# Patient Record
Sex: MALE | Race: WHITE
[De-identification: names, ages, dates, MRNs, and addresses within clinical notes are randomized per-mention and may not be internally consistent; named-entity substitution may affect disease eponyms.]

---

## 2017-06-28 ENCOUNTER — HOSPITAL ENCOUNTER (EMERGENCY)
Dept: HOSPITAL 60 - LB.ED | Age: 57
Discharge: HOME | End: 2017-06-28
Payer: MEDICAID

## 2017-06-28 DIAGNOSIS — H54.7: ICD-10-CM

## 2017-06-28 DIAGNOSIS — R19.7: ICD-10-CM

## 2017-06-28 DIAGNOSIS — A04.8: Primary | ICD-10-CM

## 2017-06-28 NOTE — EDM.PDOC
ED HPI GENERAL MEDICAL PROBLEM





- General


Chief Complaint: Gastrointestinal Problem


Stated Complaint: NAUSEA, VOMITING AND DIARRHEA


Time Seen by Provider: 06/28/17 07:50


Source of Information: Reports: Patient


History Limitations: Reports: No Limitations





- History of Present Illness


INITIAL COMMENTS - FREE TEXT/NARRATIVE: 


Pt is in the emergency room as he has had 6 bouts of watery diarrhea today. He 

claims stools are watery, asso with mild cramping of the abdomen.  He ate some 

potatoes and hamburger last night with manderine oranges.No fever or chills. 

Has not noted any blood or mucus in the stool. Stool is non foul smelling. Does 

feel bloated on and off. Pt claims that he has had diarrhea for more than a 

month now and has lost some weight during the same period. 





He was diagnosed with H-pylori gastritis by  and started on therapy 

with omerpazole, flagyl, carafate and biaxin for 2 wks, he just finished his 

meds yesterday, and it has not got any better as far as his diarrhea is 

concerned. Presently he does not have any more meds or refills and hence he is 

in the emergency room.





Duration: Week(s): (4 weeks or more now)


Improves with: Reports: None


Worsens with: Reports: None





- Related Data


 Allergies











Allergy/AdvReac Type Severity Reaction Status Date / Time


 


No Known Allergies Allergy   Verified 06/14/17 12:35














Past Medical History


HEENT History: Reports: Impaired Vision


Gastrointestinal History: Reports: Chronic Diarrhea, Helicobacter Pylori, Other 

(See Below)


Other Gastrointestinal History: pt being treated for past 2 weeks for H Pylori





- Past Surgical History


GI Surgical History: Reports: None





Social & Family History





- Family History


Family Medical History: Noncontributory





- Tobacco Use


Smoking Status *Q: Never Smoker


Second Hand Smoke Exposure: No





- Caffeine Use


Caffeine Use: Reports: Soda





- Recreational Drug Use


Recreational Drug Use: No





ED ROS GENERAL





- Review of Systems


Review Of Systems: See Below


Constitutional: Denies: Fever, Chills, Weakness


HEENT: Denies: Throat Pain, Throat Swelling, Vision Change


Respiratory: Denies: Cough, Sputum


Cardiovascular: Denies: Chest Pain, Lightheadedness


GI/Abdominal: Reports: Abdominal Pain (epigastric), Diarrhea, Distension, 

Vomiting.  Denies: Bloody Stool, Nausea


: Denies: Flank Pain, Frequency


Musculoskeletal: Denies: Joint Pain, Joint Swelling


Skin: Denies: Pruritis, Rash


Psychiatric: Denies: Agitation, Anxiety





ED EXAM, GENERAL





- Physical Exam


Exam: See Below


Exam Limited By: No Limitations


General Appearance: Alert, WD/WN, No Apparent Distress, Other (hydration is 

appropriate)


Eye Exam: Bilateral Eye: EOMI, PERRL


Ears: Normal External Exam, Normal Canal, Hearing Grossly Normal, Normal TMs


Ear Exam: Bilateral Ear: Auricle Normal, Canal Normal, TM normal


Nose: Normal Inspection, Normal Mucosa, No Blood


Throat/Mouth: Normal Inspection, Normal Lips, Normal Teeth, Normal Gums, Normal 

Oropharynx, Normal Voice, No Airway Compromise


Head: Atraumatic, Normocephalic


Neck: Normal Inspection, Supple, Non-Tender, Full Range of Motion


Respiratory/Chest: No Respiratory Distress, Lungs Clear, Normal Breath Sounds, 

No Accessory Muscle Use, Chest Non-Tender


Cardiovascular: Normal Peripheral Pulses, Regular Rate, Rhythm, No Edema, No 

Gallop, No JVD, No Murmur, No Rub


GI/Abdominal: Normal Bowel Sounds, Soft, No Organomegaly, No Distention, No 

Abnormal Bruit, No Mass, Tender (vague epigastric discomfort).  No: Guarding, 

Rigid, Rebound


Extremities: Normal Inspection, Normal Range of Motion, Non-Tender, Normal 

Capillary Refill, No Pedal Edema





Course





- Vital Signs


Text/Narrative:: 


Pt's CBC, CMP, Amylase and lipase are normal. His Stool C-diff is pending, will 

notify the results.. Apparently patient has been treated for H-pylori infection 

appropriately with 2 wks of antibiotics regime, he needs to be treated for 

underlying gastritis for total for 6 wks, hence I have given him  a script for 

omeprazole 40mg daily. Also I have discussed bland low fat diet during the same 

period. He should get upper GI study as his symptoms of dyspepsia persists, to 

make sure he has not developed peptic ulcer, in which case his PPI treatment 

needs to be for longer.





He did have a stool study for GI infection and O&P 2 wks ago which have been 

negative, so I have not repeated these test today.  He has had his well water 

checked and cleared and it is potable. 





But on further questioning the patient he claims that he has had diarrhea for 

more than a month now and has been loosing weight. Considering his age and his 

 race, he should be getting colonoscopy done.





Pt has been advised to followup with his primary care provider for further 

workup.





Last Recorded V/S: 


 Last Vital Signs











Temp  97.8 F   06/28/17 07:43


 


Pulse  73   06/28/17 07:43


 


Resp  20   06/28/17 07:43


 


BP  117/87   06/28/17 07:43


 


Pulse Ox  100   06/28/17 07:43














- Orders/Labs/Meds


Orders: 


 Active Orders 24 hr











 Category Date Time Status


 


 CLOSTRIDIUM DIFFICILE BY PCR [] Stat Lab  06/28/17 08:09 Uncollected











Labs: 


 Laboratory Tests











  06/28/17 06/28/17 06/28/17 Range/Units





  08:20 08:20 08:20 


 


WBC  4.9  D    (4.0-11.0)  K/uL


 


RBC  4.20 L    (4.50-6.50)  M/uL


 


Hgb  13.3    (13.0-18.0)  g/dL


 


Hct  38.0 L    (40.0-54.0)  %


 


MCV  91    (76-96)  fL


 


MCH  31.7    (27.0-32.0)  pg


 


MCHC  35.0    (31.0-35.0)  g/dL


 


RDW  13.2    (11.0-16.0)  %


 


Plt Count  174    (150-400)  K/uL


 


MPV  9.7    (6.0-10.0)  fL


 


Neut % (Auto)  58.7    (45.0-70.0)  %


 


Lymph % (Auto)  28.6    (20.0-40.0)  %


 


Mono % (Auto)  11.3 H    (3.0-10.0)  %


 


Eos % (Auto)  1.2    (1.0-5.0)  %


 


Baso % (Auto)  0.2    (0.0-0.5)  %


 


Neut # (Auto)  2.85    (2.00-7.50)  K/uL


 


Lymph # (Auto)  1.39 L    (1.50-4.00)  K/uL


 


Mono # (Auto)  0.55    (0.20-0.80)  K/uL


 


Eos # (Auto)  0.06    (0.04-0.40)  K/uL


 


Baso # (Auto)  0.01 L    (0.02-0.10)  K/uL


 


Sodium   142   (136-145)  mmol/L


 


Potassium   4.1   (3.5-5.1)  mmol/L


 


Chloride   105   ()  mmol/L


 


Carbon Dioxide   28.9   (21.0-32.0)  mmol/L


 


Anion Gap   12.2   (5.0-15.0)  mmol/L


 


BUN   17  D   (8-26)  mg/dL


 


Creatinine   0.94   (0.70-1.30)  mg/dL


 


Est Cr Clr Drug Dosing   TNP   


 


Estimated GFR (MDRD)   > 60   (>60)  MLS/MIN


 


BUN/Creatinine Ratio   18.1   (6-25)  


 


Glucose   105 H   ()  mg/dL


 


Calcium   9.0   (8.5-10.1)  mg/dL


 


Total Bilirubin   0.5  D   (0.0-1.0)  mg/dL


 


AST   47 H   (15-37)  U/L


 


ALT   51   (12-78)  U/L


 


Alkaline Phosphatase   53   ()  U/L


 


Total Protein   6.9   (6.4-8.2)  g/dL


 


Albumin   3.7   (3.4-5.0)  g/dL


 


Globulin   3.2   (2.2-4.2)  g/dL


 


Albumin/Globulin Ratio   1.2   (0.8-2.0)  


 


Amylase    87  D  ()  U/L


 


Lipase    251  D  ()  U/L














Departure





- Departure


Time of Disposition: 09:00


Disposition: Home, Self-Care 01


Condition: Fair


Clinical Impression: 


 Helicobacter pylori (H. pylori) infection, Diarrhea








- Discharge Information


Forms:  ED Department Discharge


Additional Instructions: 


Pt's CBC, CMP, Amylase and lipase are normal. His Stool C-diff is pending, will 

notify patient. Apparently patient has been treated for H-pylori infection 

appropriately with 2 wks of antibiotics regime, he needs to be treated for 

underlying gastritis for total for 6 wks, hence I have given him  a script for 

omeprazole 40mg daily. Also I have discussed bland low fat diet during the same 

period. He should get upper GI study as his symptoms of dyspepsia persists, to 

make sure he has not developed peptic ulcer, in which case his PPI treatment 

needs to be for longer.





He did have a stool study for GI infection and O&P 2 wks ago which have been 

negative, so I have not repeated these test today. He has had his well water 

checked and cleared and it is potable. 





But on further questioning the patient he claims that he has had diarrhea for 

more than a month now and has been loosing weight. Considering his age and his 

 race, he should be getting colonoscopy done.





Pt has been advised to followup with his primary care provider for further 

workup.








- Problem List & Annotations


(1) Diarrhea


SNOMED Code(s): 25209582


   Code(s): R19.7 - DIARRHEA, UNSPECIFIED   Status: Acute   Current Visit: Yes 

  





(2) Helicobacter pylori (H. pylori) infection


SNOMED Code(s): 286339652


   Code(s): A04.8 - OTHER SPECIFIED BACTERIAL INTESTINAL INFECTIONS   Status: 

Acute   Current Visit: Yes   





- Problem List Review


Problem List Initiated/Reviewed/Updated: Yes





- My Orders


Last 24 Hours: 


My Active Orders





06/28/17 08:09


CLOSTRIDIUM DIFFICILE BY PCR [RM] Stat 














- Assessment/Plan


Last 24 Hours: 


My Active Orders





06/28/17 08:09


CLOSTRIDIUM DIFFICILE BY PCR [RM] Stat 











Assessment:: 


 H-pylori gastritis with diarrhea





Plan: 


Pt's CBC, CMP, Amylase and lipase are normal. His Stool C-diff is negative. 

Apparently patient has been treated for H-pylori infection appropriately with 2 

wks of antibiotics regime, he needs to be treated for underlying gastritis for 

total for 6 wks, hence I have given him  a script for omeprazole 40mg daily. 

Also I have discussed bland low fat diet during the same period. He should get 

upper GI study as his symptoms of dyspepsia persists, to make sure he has not 

developed peptic ulcer, in which case his PPI treatment needs to be for longer.





He did have a stool study for GI infection and O&P 2 wks ago which have been 

negative, so I have not repeated these test today. He has had his well water 

checked and cleared and it is potable. 





But on further questioning the patient he claims that he has had diarrhea for 

more than a month now and has been loosing weight. Considering his age and his 

 race, he should be getting colonoscopy done.





Pt has been advised to followup with his primary care provider for further 

workup.

## 2017-12-08 ENCOUNTER — HOSPITAL ENCOUNTER (OUTPATIENT)
Dept: HOSPITAL 60 - LB.SDS | Age: 57
Discharge: HOME | End: 2017-12-08
Attending: SURGERY
Payer: MEDICAID

## 2017-12-08 DIAGNOSIS — K29.50: ICD-10-CM

## 2017-12-08 DIAGNOSIS — K29.80: ICD-10-CM

## 2017-12-08 DIAGNOSIS — K57.30: ICD-10-CM

## 2017-12-08 DIAGNOSIS — K44.9: ICD-10-CM

## 2017-12-08 DIAGNOSIS — Z79.899: ICD-10-CM

## 2017-12-08 DIAGNOSIS — Z12.11: Primary | ICD-10-CM

## 2017-12-08 PROCEDURE — 45378 DIAGNOSTIC COLONOSCOPY: CPT

## 2017-12-08 PROCEDURE — 43239 EGD BIOPSY SINGLE/MULTIPLE: CPT

## 2017-12-08 PROCEDURE — 88305 TISSUE EXAM BY PATHOLOGIST: CPT

## 2017-12-08 NOTE — OR
DATE OF OPERATION:  12/08/2017

 

PREOPERATIVE DIAGNOSES:

1. History of H. pylori.

2. Primary screening colonoscopy, average risk.

 

POSTOPERATIVE DIAGNOSES:

1. Moderate hiatal hernia, approximately 3 cm.

2. Chronic granular gastritis duodenitis.

3. Normal colonoscopy.

 

OPERATION:

1. Esophagogastroduodenoscopy plus biopsy.

2. Screening colonoscopy, average risk.

 

COMPLICATIONS:  None.

 

DRAINS:  None.

 

SPECIMENS:  Gastric antrum for H. pylori and pathology.

 

ESTIMATED BLOOD LOSS:  Minimal.

 

ANESTHESIA:  General propofol anesthesia.

 

INDICATION:  Mr. Leary is a 57-year-old gentleman here for his primary screening colonoscopy.

He is asymptomatic and of average risk.  He does have a remote history of H. pylori

infection and hence was referred for repeat upper endoscopy for evaluation.  The above-

mentioned procedure was explained.  The risks, benefits, and complications were explained.

The patient understood and agreed, and he was brought to the operating room.

 

DESCRIPTION OF PROCEDURE:  The patient was brought to the operating room, placed in the left

lateral decubitus position on the operating room table.  The table was then placed at a 45

degrees angle.  A mouth guard was placed, and the patient was given satisfactory general

propofol anesthesia.  We began by performing an upper endoscopy in which the endoscope was

inserted into the oral cavity and subsequently advanced under direct vision down to the

level of the 2nd portion of the duodenum.  Evaluation was carried out on withdrawal.  The

duodenum was normal except for some granularity in the ampulla and first portion suggestive

of duodenitis.  The stomach also appeared normal with normal rugal folds.  There was some

granularity in the antrum and body.  A biopsy was taken from the antrum for H. pylori

testing and pathology.  Retroflexion was then performed in the stomach which revealed a

moderate-sized hiatal hernia.  No other abnormalities found in the stomach, and stomach was

decompressed.  I withdrew the endoscope to the level of the GE junction and then measured

out the hiatal hernia which was approximately 3 cm in size.  This appeared as a sliding-

type.  The Z-line was regular and normal.  The remainder of the esophagus was normal.  The

endoscope was withdrawn.  The patient tolerated the procedure well.  Next, the table was

turned, and the patient was made flat, but left in the left lateral decubitus position, and

we performed a colonoscopy.  Prior to the colonoscopy, I performed a rectal examination

which was within normal limits.  I then placed the endoscope by finger introduction into the

rectum and subsequently advanced it to the level of the cecum.  Cecum was identified by the

appendiceal orifice, the ileocecal valve, and the cecal strap.  Next, careful evaluation of

the mucosa was performed on withdrawal.  There were no telangiectasias.  No polyps.  No

neoplastic growth; however, there were scattered diverticula throughout the descending and

distal transverse colon.  These were minimal in number.  The sigmoid was normal.  I then

performed a retroflexion maneuver in the rectum, and this was within normal limits as well.

The colon was then decompressed, and the endoscope was withdrawn.  The patient tolerated the

procedure well.  There were no complications.  Instrument count was correct.  The patient

was awoken in the OR and taken to the PACU for recovery.  Recommend followup of biopsy

results and follow up colonoscopy in 10 years.

 

 

DEBI

DD:  12/08/2017 11:01:20

DT:  12/08/2017 15:12:59

Job #:  [id number]

## 2018-05-26 ENCOUNTER — HOSPITAL ENCOUNTER (OUTPATIENT)
Dept: HOSPITAL 60 - LB.ED | Age: 58
Setting detail: OBSERVATION
LOS: 1 days | Discharge: NURSING, REHAB OR LONG-TERM CARE | End: 2018-05-27
Attending: FAMILY MEDICINE | Admitting: FAMILY MEDICINE
Payer: MEDICAID

## 2018-05-26 DIAGNOSIS — Z90.89: ICD-10-CM

## 2018-05-26 DIAGNOSIS — R07.2: Primary | ICD-10-CM

## 2018-05-26 DIAGNOSIS — Z82.49: ICD-10-CM

## 2018-05-26 DIAGNOSIS — R06.02: ICD-10-CM

## 2018-05-26 DIAGNOSIS — Z79.899: ICD-10-CM

## 2018-05-26 PROCEDURE — G0378 HOSPITAL OBSERVATION PER HR: HCPCS

## 2018-05-26 RX ADMIN — PROMETHAZINE HYDROCHLORIDE ONE ML: 6.25 SYRUP ORAL at 17:20

## 2018-05-26 RX ADMIN — Medication PRN ML: at 17:16

## 2018-05-26 RX ADMIN — NITROGLYCERIN ONE MG: 0.4 TABLET SUBLINGUAL at 17:14

## 2018-05-26 NOTE — ER
ER AND ADMISSION NOTE

 

HISTORY OF PRESENT ILLNESS:  The patient is a 57-year-old male who presented to the

emergency room with a chief complaint of chest pain.  The patient had been having chest pain

for about 2 hours, and it started at work in the kitchen.  He is not sure if he was

diaphoretic as it is hot in the kitchen.  He described it as substernal.  The pain does not

radiate to the neck or the arm.  He did have some shortness of breath.  Initially on

arrival, his pain was a 5 to 7.  The patient had a normal 12-lead EKG, was given

nitroglycerin sublingual and his pain improved.  He was also given a GI cocktail as he has a

previous history of H. pylori.  His pain was down to just minimal and he noted it only when

he took a deep breath.  Again, the patient's shortness of breath had resolved.  He was not

diaphoretic on arrival.  EKG was normal.  Initial set of troponins were normal.  It was felt

best to go ahead and admit the patient for observation and repeat later set of troponins.

 

ALLERGIES:  NKDA.

 

 

CURRENT MEDICATIONS:  Advil which he takes 800 mg once to twice daily, he also takes some

Tylenol 325 p.r.n. and omeprazole 20 mg daily.

 

PAST MEDICAL HISTORY:  Significant for H pylori.

 

PAST SURGICAL HISTORY:  Significant for tonsillectomy and adenoidectomy.

 

FAMILY HISTORY:  The patient has a family history of coronary artery disease.  His father is

age 74 and has had several events.  His grandfather's brother  in his 30s.

 

SOCIAL HISTORY:  The patient does not smoke.  He has never had a cholesterol checked.  He

works as a cook.

 

PHYSICAL EXAMINATION:

GENERAL:  He is alert, oriented, no apparent distress.  VITAL SIGNS: Were stable.  HEENT:

Unremarkable.  NECK:  Supple.  No nodes.  No bruits.  HEART:  Regular sinus rhythm without

murmur.  LUNGS:  Clear.  ABDOMEN:  Soft, nontender.  Positive bowel sounds.  No

hepatosplenomegaly.  :  Was not examined.  EXTREMITIES:  No clubbing, cyanosis, or edema.

NEUROLOGIC:  Exam is nonfocal.

 

LABORATORY DATA:  White count is normal at 4.7, hemoglobin and hematocrit is minimally

decreased at 12.5 and 36.4.  Comprehensive metabolic panel is basically within normal

limits.  BUN is 25, creatinine is 1.0.  Initial troponin is less than 0.017.

 

12-lead EKG shows sinus bradycardia, the patient's chest pain had basically resolved.

 

Chest x-ray shows a possible granuloma in the right upper lobe, but otherwise appears

normal.

 

ASSESSMENT:  Chest pain.

 

PLAN:  We will go ahead and refer the patient to 23-hour observation and obtain a repeat

troponin at about 10 o'clock tonight.  If the patient's troponin is negative, we will

discharge him in the morning and set him up for stress test as an outpatient.

 

 

ROHIT

DD:  2018 18:27:22

DT:  2018 20:32:08

Job #:  [id number]

## 2018-05-28 NOTE — CR
PA AND LATERAL CHEST, 05/26/18



No priors.



The heart size is normal.  



There is an 8 mm rounded calcific density overlying the right apex.  It also 
overlies the sternoclavicular junction on the right.  This could be a calcified 
granuloma within the right lung apex consistent with prior granulomatous 
disease.  It could be a sclerotic lesion within the anterior first rib.  
Additional views may be helpful.  



The lungs are otherwise clear.  No pneumothorax.  No pleural effusions.  No 
areas of consolidation.  



[id number]
MTDD

## 2018-05-29 NOTE — DISCH
DISCHARGE/TRANSFER NOTE

 

HISTORY OF PRESENT ILLNESS:  The patient is a 57-year-old male who presented to the

emergency room with chest pain earlier this evening.  At the time, the patient's 12-lead EKG

just showed sinus rhythm with a rate in the 50s.  The patient's blood pressure was good.  He

received 1 sublingual nitroglycerin.  He had taken aspirin prior to arriving in the

emergency room.  He got a GI cocktail as he does have a history of epigastric pain and H

pylori.  His pain resolved pretty much.  The patient was admitted to telemetry.  CBC,

comprehensive metabolic panel, and initial troponin were normal.  We repeated his troponin

about 5 hours after the first one.  His second troponin was also normal.  However, the

patient was on a telemetry bed and his heart rate has gradually decreased.  He is running

quite frequently in the 40s.  His blood pressure drops down to 106/69.  The patient notes

that normally his blood pressure at home runs in the 120s and his heart rate is in the 60s

because he checks it with his girlfriend's blood pressure cuff.  He is minimally symptomatic

with the bradycardia, but he has dropped down to 37 and 38 for brief runs and he is quite

frequently in the low 40s.  His blood pressure is a little lower than normal.  He still has

some very minimal chest pain and it is largely with inspiration.  I did call down and

discuss the patient with Dr. Epstein in Cardiology in [place] who recommended transferring the

patient down.  Our ambulance is currently out and unavailable.  We are only a BLS ambulance.

Since the patient could potentially need external pacing, we opted to fly him down to

[place].  The patient has actually been quite stable with all this, but nonetheless that is

a definitely abnormal heart rate.  EKG really does not show anything other than sinus

bradycardia and the patient's labs have been normal also, and he has been minimally

symptomatic, but I do not really want him to deteriorate and have to send him with an

external pacemaker.

 

 

IRA/TANNA

DD:  05/27/2018 00:53:58

DT:  05/27/2018 01:24:39

Job #:  [id number]

## 2019-01-21 ENCOUNTER — HOSPITAL ENCOUNTER (EMERGENCY)
Dept: HOSPITAL 60 - LB.ED | Age: 59
Discharge: HOME | End: 2019-01-21
Payer: MEDICAID

## 2019-01-21 DIAGNOSIS — G44.209: Primary | ICD-10-CM

## 2019-01-21 DIAGNOSIS — Z79.899: ICD-10-CM

## 2019-01-21 DIAGNOSIS — Z79.82: ICD-10-CM

## 2019-01-21 RX ADMIN — KETOROLAC TROMETHAMINE ONE: 30 INJECTION, SOLUTION INTRAMUSCULAR at 07:30

## 2019-01-21 RX ADMIN — KETOROLAC TROMETHAMINE ONE MG: 30 INJECTION, SOLUTION INTRAMUSCULAR at 07:20

## 2019-01-21 NOTE — EDM.PDOC
ED HPI GENERAL MEDICAL PROBLEM





- General


Chief Complaint: Headache


Stated Complaint: HEADACHE


Time Seen by Provider: 01/21/19 07:15


Source of Information: Reports: Patient


History Limitations: Reports: No Limitations





- History of Present Illness


INITIAL COMMENTS - FREE TEXT/NARRATIVE: 





This patient presents to the ED for evaluation of a headache. He states he woke 

to his alarm at 0520 and felt the headache in the back of his head and both 

sides. He states the headache did not wake him. He felt weak and dizzy and had 

vomiting after drinking gatorade. He did drive himself to the ED despite the 

symptoms described. He rates he pain at 7/10 and continues to complain of 

nausea. 





He denies recent illness but states he "has been hacking all winter, like 

everyone else" but has not had a fever or sought any healthcare for illness. He 

states he has "a bad heart" and was flown to [place] for chest pain this past 

summer but "they didn't find anything" and he is not on any medications.





- Related Data


 Allergies











Allergy/AdvReac Type Severity Reaction Status Date / Time


 


No Known Allergies Allergy   Verified 12/07/17 13:42











Home Meds: 


 Home Meds





Omeprazole 20 mg PO QPM 12/07/17 [History]


Aspirin 81 mg PO DAILY 11/04/18 [History]











Past Medical History


HEENT History: Reports: Impaired Vision


Gastrointestinal History: Reports: Chronic Diarrhea, Helicobacter Pylori


Other Gastrointestinal History: pt being treated for past 2 weeks for H Pylori


Musculoskeletal History: Reports: Arthritis, Fracture





- Past Surgical History


HEENT Surgical History: Reports: Adenoidectomy, Tonsillectomy


GI Surgical History: Reports: Colostomy





Social & Family History





- Family History


Family Medical History: Noncontributory


Cardiac: Reports: CAD, Stent


Other Cardiac Family History: great uncle with MI at 35 years of age


Oncologic: Reports: Breast, Lung





- Caffeine Use


Caffeine Use: Reports: Soda





ED ROS GENERAL





- Review of Systems


Review Of Systems: See Below


Constitutional: Denies: Fever, Chills


HEENT: Reports: No Symptoms


Respiratory: Reports: No Symptoms


Cardiovascular: Reports: No Symptoms


GI/Abdominal: Reports: Vomiting


Musculoskeletal: Reports: No Symptoms


Skin: Reports: No Symptoms


Neurological: Reports: Dizziness, Headache





- Physical Exam


Exam: See Below


Exam Limited By: No Limitations


General Appearance: Alert, WD/WN, No Apparent Distress


Eye Exam: Bilateral Eye: PERRL


Ears: Normal External Exam


Nose: Normal Inspection


Throat/Mouth: Normal Inspection


Head Exam: Atraumatic, Normocephalic


Neck: Normal Inspection, Supple, Full Range of Motion


Respiratory/Chest: No Respiratory Distress, Lungs Clear, Normal Breath Sounds


Cardiovascular: Regular Rate, Rhythm


Neuro Exam (Abbreviated): Alert, Oriented, CN II-XII Intact, Normal Cognition, 

Normal Gait, No Motor/Sensory Deficits, Other (headache)


Extremities: Normal Inspection, Normal Range of Motion


Psychiatric: Normal Affect, Normal Mood


Skin Exam: Warm, Dry, Normal Color, No Rash





Course





- Vital Signs


Last Recorded V/S: 


 Last Vital Signs











Temp  36.6 C   01/21/19 07:07


 


Pulse  66   01/21/19 07:07


 


Resp  12   01/21/19 07:07


 


BP  113/84   01/21/19 07:07


 


Pulse Ox      














- Orders/Labs/Meds


Meds: 


Medications














Discontinued Medications














Generic Name Dose Route Start Last Admin





  Trade Name Elly  PRN Reason Stop Dose Admin


 


Ketorolac Tromethamine  30 mg  01/21/19 07:20  01/21/19 07:20





  Toradol  IM  01/21/19 07:21  30 mg





  ONETIME ONE   Administration





     





     





     





     


 


Ketorolac Tromethamine  Confirm  01/21/19 07:27  01/21/19 07:30





  Toradol  Administered  01/21/19 07:28  Not Given





  Dose   





  30 mg   





  .ROUTE   





  .STK-MED ONE   





     





     





     





     














- Re-Assessments/Exams


Free Text/Narrative Re-Assessment/Exam: 


This patient presents with a headache. I considered a broad differential 

diagnosis for this patient including tension, migraine, analgesic rebound, 

occipital neuralgia, etc. Other less common but serious causes considered 

included meningitis, encephalitis, subarachnoid bleed, stroke, tumor, etc. The 

patient has no signs of serious headache etiologies at this point. No advanced 

imaging is indicated, nor is CT/lumbar puncture for SAH. Patients questions 

were answered and they feel improved after the above interventions in the ED. 

Supportive outpatient management is therefore indicated. Headache precautions 

given for home.


01/21/19 07:29








Departure





- Departure


Time of Disposition: 07:50


Disposition: Home, Self-Care 01


Condition: Good


Clinical Impression: 


 Headache, Tension-type headache








- Discharge Information


*PRESCRIPTION DRUG MONITORING PROGRAM REVIEWED*: No


*COPY OF PRESCRIPTION DRUG MONITORING REPORT IN PATIENT JAMILAH: No


Instructions:  General Headache Without Cause


Referrals: 


PCP,None [Primary Care Provider] - 


Forms:  ED Department Discharge